# Patient Record
Sex: MALE | Race: WHITE
[De-identification: names, ages, dates, MRNs, and addresses within clinical notes are randomized per-mention and may not be internally consistent; named-entity substitution may affect disease eponyms.]

---

## 2019-09-26 ENCOUNTER — HOSPITAL ENCOUNTER (OUTPATIENT)
Dept: HOSPITAL 75 - LAB | Age: 84
End: 2019-09-26
Attending: UROLOGY
Payer: MEDICARE

## 2019-09-26 DIAGNOSIS — R31.0: Primary | ICD-10-CM

## 2019-09-26 LAB
APTT PPP: YELLOW S
BACTERIA #/AREA URNS HPF: NEGATIVE /HPF
BILIRUB UR QL STRIP: NEGATIVE
FIBRINOGEN PPP-MCNC: CLEAR MG/DL
GLUCOSE UR STRIP-MCNC: NEGATIVE MG/DL
KETONES UR QL STRIP: NEGATIVE
LEUKOCYTE ESTERASE UR QL STRIP: (no result)
NITRITE UR QL STRIP: NEGATIVE
OTHER ELEMENTS URNS MICRO: (no result) /HPF
PH UR STRIP: 6.5 [PH] (ref 5–9)
PROT UR QL STRIP: (no result)
RBC #/AREA URNS HPF: >100 /HPF
SP GR UR STRIP: 1.01 (ref 1.02–1.02)
SQUAMOUS #/AREA URNS HPF: (no result) /HPF
UROBILINOGEN UR-MCNC: NORMAL MG/DL
WBC #/AREA URNS HPF: (no result) /HPF

## 2019-09-26 PROCEDURE — 81000 URINALYSIS NONAUTO W/SCOPE: CPT

## 2019-10-01 ENCOUNTER — HOSPITAL ENCOUNTER (OUTPATIENT)
Dept: HOSPITAL 75 - RAD FS | Age: 84
End: 2019-10-01
Attending: UROLOGY
Payer: MEDICARE

## 2019-10-01 DIAGNOSIS — K57.30: ICD-10-CM

## 2019-10-01 DIAGNOSIS — K76.9: ICD-10-CM

## 2019-10-01 DIAGNOSIS — K80.20: ICD-10-CM

## 2019-10-01 DIAGNOSIS — K40.90: ICD-10-CM

## 2019-10-01 DIAGNOSIS — N28.1: Primary | ICD-10-CM

## 2019-10-01 DIAGNOSIS — N20.0: ICD-10-CM

## 2019-10-01 PROCEDURE — 74176 CT ABD & PELVIS W/O CONTRAST: CPT

## 2019-10-01 NOTE — DIAGNOSTIC IMAGING REPORT
PROCEDURE: CT abdomen and pelvis without contrast.



TECHNIQUE: Multiple contiguous axial images were obtained through

the abdomen and pelvis without the use of intravenous contrast.

Auto Exposure Controls were utilized during the CT exam to meet

ALARA standards for radiation dose reduction. 



INDICATION:  Gross hematuria.  Abnormal lab values.  History of

prostate surgery.



COMPARISON:  None.



FINDINGS:



The lung bases are clear.  The heart is upper normal in size. 

Pacemaker leads are noted.



There is fatty infiltration of the liver.  There is a 1.4 x 1.0

cm hypoattenuating lesion in the left liver, which appears well

circumscribed and likely a cyst.  There is also a circumscribed

hypoattenuating lesion in the right liver measuring 2.5 x 2.5 cm

in size (image 22, series 2), which likely represents a cyst as

well.  There are numerous calcified stones in the gallbladder. 

There is calcification along the lateral dome of the liver

underlying the diaphragm.  The spleen appears normal.  The

pancreas is normal.  The adrenal glands appear normal.



The right kidney demonstrates multiple lesions, some of which are

hypoattenuating and some are isoattenuating.  The largest is 5.4

cm in size and consistent with a simple cyst.  There is an

exophytic 1.7 cm lesion on the right kidney measuring 14

Hounsfield units, most likely a cyst as well.  The left kidney

demonstrates multiple cysts as well, with multiple nonobstructing

calculi, the largest measuring 6 mm in diameter.  There is a

low-density mass-like structure at the inferior pole of the left

kidney, which measures up to 4.7 x 4.1 cm on axial imaging and

has a somewhat lobulated appearance.



The bowel loops are nondistended without obstruction.  The

appendix is normal.  There is moderate stool throughout the

colon.  There is diverticulosis seen throughout the colon without

evidence of diverticulitis.  No free fluid or free air is seen. 

There is a fat-containing right inguinal hernia without bowel

involvement.  There are advanced degenerative changes in the

bilateral hips, with chondrocalcinosis.



IMPRESSION:

1.  There are multiple cysts in the kidneys bilaterally; however,

there is one hypoattenuating mass-like lesion at the inferior

left kidney, which is indeterminate and concerning for neoplasm. 

Recommend CT or MRI using renal mass protocol.

2.  Nonobstructing calculi in the left kidney.  No hydronephrosis

or obstructing calculi are seen.

3.  Hypoattenuating lesions in the liver are thought to represent

cysts.  Recommend attention on follow-up examination.  There is

hepatic steatosis.

4.  Cholelithiasis.

5.  Moderate stool in the colon with colonic diverticulosis.

6.  Fat-containing right inguinal hernia.



Dictated by: 



  Dictated on workstation # [id number] Rarely

## 2019-10-08 ENCOUNTER — HOSPITAL ENCOUNTER (OUTPATIENT)
Dept: HOSPITAL 75 - RAD FS | Age: 84
End: 2019-10-08
Attending: UROLOGY
Payer: MEDICARE

## 2019-10-08 DIAGNOSIS — K76.89: ICD-10-CM

## 2019-10-08 DIAGNOSIS — K80.20: Primary | ICD-10-CM

## 2019-10-08 DIAGNOSIS — K44.9: ICD-10-CM

## 2019-10-08 DIAGNOSIS — K86.9: ICD-10-CM

## 2019-10-08 DIAGNOSIS — N28.89: ICD-10-CM

## 2019-10-08 LAB
BUN/CREAT SERPL: 16
CREAT SERPL-MCNC: 1.28 MG/DL (ref 0.6–1.3)
GFR SERPLBLD BASED ON 1.73 SQ M-ARVRAT: 54 ML/MIN

## 2019-10-08 PROCEDURE — 74170 CT ABD WO CNTRST FLWD CNTRST: CPT

## 2019-10-08 PROCEDURE — 36415 COLL VENOUS BLD VENIPUNCTURE: CPT

## 2019-10-08 PROCEDURE — 84520 ASSAY OF UREA NITROGEN: CPT

## 2019-10-08 PROCEDURE — 82565 ASSAY OF CREATININE: CPT

## 2019-10-08 NOTE — DIAGNOSTIC IMAGING REPORT
PROCEDURE: CT abdomen with and without contrast.



TECHNIQUE: Multiple contiguous axial CT images of the abdomen

were obtained prior to and after intravenous administration of

iodinated contrast. Auto Exposure Controls were utilized during

the CT exam to meet ALARA standards for radiation dose reduction.





INDICATION:  Gross hematuria.



FINDINGS: The recent non-contrast CT abdomen/pelvis exam of

10/01/2019 noted cysts involving both kidneys. However, there did

appear to be a 4.7 x 4.1 cm hypoattenuating lesion associated

with the left kidney which had somewhat of a lobulated

appearance. This finding was felt to be worrisome for malignancy.





On this exam, postcontrast images reveal that there is a

multiseptated cystic mass in this region with a solid component

measuring approximately 1.6 x 2.9 cm. This finding should be

considered neoplastic until proven otherwise. There are

benign-appearing cysts associated with both kidneys as well.



The nonobstructive calculi involving the left kidney noted

previously are again evident. There is no sign of obstruction of

either collecting system.



There also appears to be a complex predominantly cystic 1.1 x 1.2

cm mass in the head of the pancreas. This finding is nonspecific

but worrisome for malignancy. MRI could provide additional

information regarding this finding but it appears that the

patient has a defibrillator device in place and this would

preclude further evaluation by MRI. If additional imaging is

desired, then PET/CT would be recommended.



The prior study also suggested benign appearing cysts within the

liver. Those findings are again evident and do not seem to have

changed significantly. The cysts have a generally benign

appearance. The 0.6 x 2.3 cm peripheral calcification of the

right lobe of liver seen previously is again evident. The liver

is otherwise unremarkable. The spleen, adrenals, aorta and

inferior vena cava show no sign of acute abnormality. As seen on

the prior exam, there are dense calcifications within the

gallbladder. There is no sign of acute cholecystitis.



The stomach is not well-distended and consequently difficult to

assess. There is a 1.8 x 2.5 cm hiatal hernia.



The lung bases are clear.



The bone windows show no evidence for fracture or for destructive

lesion.



IMPRESSION:

1. There is a complex septated 4.7 x 4.1 cm mass involving the

right kidney. This mass should be considered neoplastic until

proven otherwise.

2. There is also a much smaller complex cystic mass in the head

of the pancreas. This finding is nonspecific but worrisome for

malignancy as well. Recommendations as above.

3. There are benign-appearing cysts associated with the liver.

4. There is a small hiatal hernia. Cholelithiasis is also again

seen but there is no evidence for acute cholecystitis.

5. There is no acute abnormality of the abdomen identified.





Dictated by: 



  Dictated on workstation # [id number]

## 2020-06-10 ENCOUNTER — HOSPITAL ENCOUNTER (OUTPATIENT)
Dept: HOSPITAL 75 - LAB FS | Age: 85
End: 2020-06-10
Attending: FAMILY MEDICINE
Payer: MEDICARE

## 2020-06-10 DIAGNOSIS — I10: Primary | ICD-10-CM

## 2020-06-10 LAB
ALBUMIN SERPL-MCNC: 4.4 GM/DL (ref 3.2–4.5)
ALP SERPL-CCNC: 64 U/L (ref 40–136)
ALT SERPL-CCNC: 12 U/L (ref 0–55)
BILIRUB SERPL-MCNC: 0.6 MG/DL (ref 0.1–1)
BUN/CREAT SERPL: 12
CALCIUM SERPL-MCNC: 9.5 MG/DL (ref 8.5–10.1)
CHLORIDE SERPL-SCNC: 105 MMOL/L (ref 98–107)
CHOLEST SERPL-MCNC: 160 MG/DL (ref ?–200)
CO2 SERPL-SCNC: 26 MMOL/L (ref 21–32)
CREAT SERPL-MCNC: 2.09 MG/DL (ref 0.6–1.3)
GFR SERPLBLD BASED ON 1.73 SQ M-ARVRAT: 31 ML/MIN
GLUCOSE SERPL-MCNC: 100 MG/DL (ref 70–105)
HDLC SERPL-MCNC: 30 MG/DL (ref 40–60)
POTASSIUM SERPL-SCNC: 4.7 MMOL/L (ref 3.6–5)
PROT SERPL-MCNC: 7 GM/DL (ref 6.4–8.2)
SODIUM SERPL-SCNC: 141 MMOL/L (ref 135–145)
TRIGL SERPL-MCNC: 155 MG/DL (ref ?–150)
VLDLC SERPL CALC-MCNC: 31 MG/DL (ref 5–40)

## 2020-06-10 PROCEDURE — 80061 LIPID PANEL: CPT

## 2020-06-10 PROCEDURE — 80053 COMPREHEN METABOLIC PANEL: CPT

## 2020-06-10 PROCEDURE — 36415 COLL VENOUS BLD VENIPUNCTURE: CPT

## 2020-08-25 ENCOUNTER — HOSPITAL ENCOUNTER (OUTPATIENT)
Dept: HOSPITAL 75 - LAB FS | Age: 85
End: 2020-08-25
Attending: FAMILY MEDICINE
Payer: MEDICARE

## 2020-08-25 DIAGNOSIS — Z01.89: Primary | ICD-10-CM

## 2020-08-25 LAB
BUN/CREAT SERPL: 14
CALCIUM SERPL-MCNC: 9.3 MG/DL (ref 8.5–10.1)
CHLORIDE SERPL-SCNC: 104 MMOL/L (ref 98–107)
CO2 SERPL-SCNC: 25 MMOL/L (ref 21–32)
CREAT SERPL-MCNC: 1.9 MG/DL (ref 0.6–1.3)
GFR SERPLBLD BASED ON 1.73 SQ M-ARVRAT: 34 ML/MIN
GLUCOSE SERPL-MCNC: 117 MG/DL (ref 70–105)
POTASSIUM SERPL-SCNC: 4.8 MMOL/L (ref 3.6–5)
SODIUM SERPL-SCNC: 138 MMOL/L (ref 135–145)

## 2020-08-25 PROCEDURE — 36415 COLL VENOUS BLD VENIPUNCTURE: CPT

## 2020-08-25 PROCEDURE — 80048 BASIC METABOLIC PNL TOTAL CA: CPT

## 2020-09-10 ENCOUNTER — HOSPITAL ENCOUNTER (OUTPATIENT)
Dept: HOSPITAL 75 - RAD FS | Age: 85
End: 2020-09-10
Attending: UROLOGY
Payer: MEDICARE

## 2020-09-10 DIAGNOSIS — C64.2: Primary | ICD-10-CM

## 2020-09-10 DIAGNOSIS — Z95.0: ICD-10-CM

## 2020-09-10 PROCEDURE — 71046 X-RAY EXAM CHEST 2 VIEWS: CPT

## 2020-09-10 NOTE — DIAGNOSTIC IMAGING REPORT
INDICATION: Malignant cancer of the kidney.



TIME OF EXAM: 12:48 p.m.



COMPARISON: No prior studies are available for comparison.



FINDINGS: Heart size is normal. Lungs are hyperinflated

consistent with COPD. There is a dual lead left side cardiac

pacemaker in place. There is a slightly rounded density

identified in the left suprahilar location which is

indeterminate. This could represent a vessel en face. There is

also a slightly rounded density in the right midlung peripherally

measuring approximate 11 mm in size. Pulmonary nodule cannot be

entirely excluded. There is no effusion. There is no

pneumothorax.



IMPRESSION: Bilateral parenchymal densities. These are

indeterminate. CT chest would be recommended for further

evaluation. 



Dictated by: 



  Dictated on workstation # [id number]

## 2020-09-16 ENCOUNTER — HOSPITAL ENCOUNTER (OUTPATIENT)
Dept: HOSPITAL 75 - RAD FS | Age: 85
End: 2020-09-16
Attending: UROLOGY
Payer: MEDICARE

## 2020-09-16 DIAGNOSIS — Z90.5: ICD-10-CM

## 2020-09-16 DIAGNOSIS — N28.1: Primary | ICD-10-CM

## 2020-09-16 DIAGNOSIS — K76.89: ICD-10-CM

## 2020-09-16 PROCEDURE — 74150 CT ABDOMEN W/O CONTRAST: CPT

## 2020-09-16 NOTE — DIAGNOSTIC IMAGING REPORT
PROCEDURE: 

CT abdomen without contrast.



TECHNIQUE: 

Multiple contiguous axial images were obtained through the

abdomen without the use of intravenous contrast. Auto Exposure

Controls were utilized during the CT exam to meet ALARA standards

for radiation dose reduction. 



INDICATION:  

Renal mass.



COMPARISON:    

10/08/2019.



FINDINGS:

The unenhanced images of the liver and spleen are stable with

multiple hepatic cysts and dystrophic calcification in the

periphery of the right hepatic lobe. There is a fat-containing

structure in the uncinate process which could represent a lipoma.

The pancreatic head lesion seen on the previous contrast enhanced

study is not well seen on the current examination. The pancreas

is otherwise unremarkable. Dominant cysts are again noted in the

right kidney. Evaluation of the kidneys is somewhat limited

without intravenous contrast; however, no new mass or

hydronephrosis is identified. The left kidney is surgically

absent. No mass is seen within the left renal bed. The adrenal

glands are unremarkable. There is no evidence of free fluid or

pathologically enlarged adenopathy.



IMPRESSION: 

Interval left nephrectomy without discrete residual mass or

adenopathy. There are hepatic and right renal cysts present with

focal low density in the uncinate process which could represent a

lipoma. The suspected lesion in the pancreatic head seen on the

previous contrast-enhanced study is less conspicuous on the

current exam.



Note is made of cholelithiasis; however, no new abnormality or

adverse change is seen.



Dictated by: 



  Dictated on workstation # [id number]

## 2020-10-13 ENCOUNTER — HOSPITAL ENCOUNTER (OUTPATIENT)
Dept: HOSPITAL 75 - RAD FS | Age: 85
End: 2020-10-13
Attending: UROLOGY
Payer: MEDICARE

## 2020-10-13 DIAGNOSIS — N28.89: Primary | ICD-10-CM

## 2020-10-13 PROCEDURE — 71250 CT THORAX DX C-: CPT

## 2020-10-13 NOTE — DIAGNOSTIC IMAGING REPORT
INDICATION: Renal mass, metastatic workup.



TECHNIQUE: Multiple contiguous axial images were obtained through

the chest without the use of intravenous contrast. Auto Exposure

Controls were utilized during the CT exam to meet ALARA standards

for radiation dose reduction. 



CT chest obtained without IV contrast. There is no prior chest CT

for comparison.



FINDINGS: There are no enlarged mediastinal or hilar nodes. There

is a dual-lead pacemaker device in place with right atrial and

ventricular leads. There are no enlarged axillary nodes or chest

wall masses. There is no pleural or pericardial fluid.



There are no destructive bony lesions.



Lung parenchymal windows demonstrate a calcified granuloma in the

right apex. There is no consolidation or suspicious pulmonary

parenchymal nodule.



Visualized portions of the upper abdomen demonstrate

benign-appearing cysts in the right kidney and right lobe of the

liver, patient has had left nephrectomy.



IMPRESSION:



CT chest shows no evidence of metastatic disease in the chest.

There is a calcified granuloma in the right apex. There is no

adenopathy or pleural fluid.



Dictated by: 



  Dictated on workstation # [id number]

## 2020-11-09 ENCOUNTER — HOSPITAL ENCOUNTER (OUTPATIENT)
Dept: HOSPITAL 75 - LAB FS | Age: 85
End: 2020-11-09
Attending: NURSE PRACTITIONER
Payer: MEDICARE

## 2020-11-09 DIAGNOSIS — Z01.812: Primary | ICD-10-CM

## 2020-11-09 DIAGNOSIS — Z20.828: ICD-10-CM

## 2020-11-09 PROCEDURE — 87635 SARS-COV-2 COVID-19 AMP PRB: CPT

## 2020-12-15 ENCOUNTER — HOSPITAL ENCOUNTER (OUTPATIENT)
Dept: HOSPITAL 75 - LAB FS | Age: 85
End: 2020-12-15
Attending: FAMILY MEDICINE
Payer: MEDICARE

## 2020-12-15 DIAGNOSIS — I10: Primary | ICD-10-CM

## 2020-12-15 LAB
ALBUMIN SERPL-MCNC: 4.7 GM/DL (ref 3.2–4.5)
ALP SERPL-CCNC: 67 U/L (ref 40–136)
ALT SERPL-CCNC: 13 U/L (ref 0–55)
BILIRUB SERPL-MCNC: 0.5 MG/DL (ref 0.1–1)
BUN/CREAT SERPL: 15
CALCIUM SERPL-MCNC: 9.5 MG/DL (ref 8.5–10.1)
CHLORIDE SERPL-SCNC: 107 MMOL/L (ref 98–107)
CHOLEST SERPL-MCNC: 161 MG/DL (ref ?–200)
CO2 SERPL-SCNC: 26 MMOL/L (ref 21–32)
CREAT SERPL-MCNC: 1.93 MG/DL (ref 0.6–1.3)
GFR SERPLBLD BASED ON 1.73 SQ M-ARVRAT: 33 ML/MIN
GLUCOSE SERPL-MCNC: 102 MG/DL (ref 70–105)
HDLC SERPL-MCNC: 33 MG/DL (ref 40–60)
POTASSIUM SERPL-SCNC: 4.8 MMOL/L (ref 3.6–5)
PROT SERPL-MCNC: 7.1 GM/DL (ref 6.4–8.2)
SODIUM SERPL-SCNC: 142 MMOL/L (ref 135–145)
TRIGL SERPL-MCNC: 167 MG/DL (ref ?–150)
VLDLC SERPL CALC-MCNC: 33 MG/DL (ref 5–40)

## 2020-12-15 PROCEDURE — 80053 COMPREHEN METABOLIC PANEL: CPT

## 2020-12-15 PROCEDURE — 36415 COLL VENOUS BLD VENIPUNCTURE: CPT

## 2020-12-15 PROCEDURE — 80061 LIPID PANEL: CPT

## 2020-12-31 ENCOUNTER — HOSPITAL ENCOUNTER (OUTPATIENT)
Dept: HOSPITAL 75 - LAB FS | Age: 85
End: 2020-12-31
Attending: FAMILY MEDICINE
Payer: MEDICARE

## 2020-12-31 DIAGNOSIS — Z20.828: Primary | ICD-10-CM

## 2020-12-31 PROCEDURE — 87635 SARS-COV-2 COVID-19 AMP PRB: CPT

## 2021-06-24 ENCOUNTER — HOSPITAL ENCOUNTER (OUTPATIENT)
Dept: HOSPITAL 75 - LAB FS | Age: 86
End: 2021-06-24
Attending: FAMILY MEDICINE
Payer: MEDICARE

## 2021-06-24 DIAGNOSIS — I10: Primary | ICD-10-CM

## 2021-06-24 LAB
ALBUMIN SERPL-MCNC: 4.6 GM/DL (ref 3.2–4.5)
ALP SERPL-CCNC: 62 U/L (ref 40–136)
ALT SERPL-CCNC: 17 U/L (ref 0–55)
BILIRUB SERPL-MCNC: 0.6 MG/DL (ref 0.1–1)
BUN/CREAT SERPL: 11
CALCIUM SERPL-MCNC: 9.3 MG/DL (ref 8.5–10.1)
CHLORIDE SERPL-SCNC: 108 MMOL/L (ref 98–107)
CHOLEST SERPL-MCNC: 162 MG/DL (ref ?–200)
CO2 SERPL-SCNC: 23 MMOL/L (ref 21–32)
CREAT SERPL-MCNC: 1.95 MG/DL (ref 0.6–1.3)
GFR SERPLBLD BASED ON 1.73 SQ M-ARVRAT: 33 ML/MIN
GLUCOSE SERPL-MCNC: 101 MG/DL (ref 70–105)
HDLC SERPL-MCNC: 32 MG/DL (ref 40–60)
POTASSIUM SERPL-SCNC: 4.3 MMOL/L (ref 3.6–5)
PROT SERPL-MCNC: 7.1 GM/DL (ref 6.4–8.2)
SODIUM SERPL-SCNC: 142 MMOL/L (ref 135–145)
TRIGL SERPL-MCNC: 159 MG/DL (ref ?–150)
VLDLC SERPL CALC-MCNC: 32 MG/DL (ref 5–40)

## 2021-06-24 PROCEDURE — 36415 COLL VENOUS BLD VENIPUNCTURE: CPT

## 2021-06-24 PROCEDURE — 80053 COMPREHEN METABOLIC PANEL: CPT

## 2021-06-24 PROCEDURE — 80061 LIPID PANEL: CPT

## 2021-09-07 ENCOUNTER — HOSPITAL ENCOUNTER (OUTPATIENT)
Dept: HOSPITAL 75 - LAB FS | Age: 86
End: 2021-09-07
Attending: INTERNAL MEDICINE
Payer: MEDICARE

## 2021-09-07 DIAGNOSIS — N18.32: Primary | ICD-10-CM

## 2021-09-07 LAB
APTT PPP: YELLOW S
BACTERIA #/AREA URNS HPF: NEGATIVE /HPF
BILIRUB UR QL STRIP: NEGATIVE
BUN/CREAT SERPL: 12
CALCIUM SERPL-MCNC: 9.2 MG/DL (ref 8.5–10.1)
CHLORIDE SERPL-SCNC: 107 MMOL/L (ref 98–107)
CO2 SERPL-SCNC: 23 MMOL/L (ref 21–32)
CREAT SERPL-MCNC: 2.15 MG/DL (ref 0.6–1.3)
FIBRINOGEN PPP-MCNC: CLEAR MG/DL
GFR SERPLBLD BASED ON 1.73 SQ M-ARVRAT: 29 ML/MIN
GLUCOSE SERPL-MCNC: 142 MG/DL (ref 70–105)
GLUCOSE UR STRIP-MCNC: NEGATIVE MG/DL
KETONES UR QL STRIP: (no result)
LEUKOCYTE ESTERASE UR QL STRIP: NEGATIVE
NITRITE UR QL STRIP: NEGATIVE
OTHER ELEMENTS URNS MICRO: (no result) /HPF
PH UR STRIP: 5.5 [PH] (ref 5–9)
POTASSIUM SERPL-SCNC: 4.7 MMOL/L (ref 3.6–5)
PROT UR QL STRIP: NEGATIVE
RBC #/AREA URNS HPF: (no result) /HPF
SODIUM SERPL-SCNC: 141 MMOL/L (ref 135–145)
SP GR UR STRIP: 1.02 (ref 1.02–1.02)
SQUAMOUS #/AREA URNS HPF: (no result) /HPF
WBC #/AREA URNS HPF: (no result) /HPF

## 2021-09-07 PROCEDURE — 85018 HEMOGLOBIN: CPT

## 2021-09-07 PROCEDURE — 84100 ASSAY OF PHOSPHORUS: CPT

## 2021-09-07 PROCEDURE — 80048 BASIC METABOLIC PNL TOTAL CA: CPT

## 2021-09-07 PROCEDURE — 81000 URINALYSIS NONAUTO W/SCOPE: CPT

## 2021-09-07 PROCEDURE — 83970 ASSAY OF PARATHORMONE: CPT

## 2021-09-07 PROCEDURE — 36415 COLL VENOUS BLD VENIPUNCTURE: CPT

## 2021-09-07 PROCEDURE — 85014 HEMATOCRIT: CPT

## 2021-09-07 PROCEDURE — 82306 VITAMIN D 25 HYDROXY: CPT

## 2021-09-08 LAB
HCT VFR BLD CALC: 42 % (ref 40–54)
HGB BLD-MCNC: 14.5 G/DL (ref 13.3–17.7)
PHOSPHATE SERPL-MCNC: 3.3 MG/DL (ref 2.3–4.7)

## 2021-10-18 ENCOUNTER — HOSPITAL ENCOUNTER (OUTPATIENT)
Dept: HOSPITAL 75 - RAD FS | Age: 86
End: 2021-10-18
Attending: FAMILY MEDICINE
Payer: MEDICARE

## 2021-10-18 DIAGNOSIS — M16.11: Primary | ICD-10-CM

## 2021-10-18 PROCEDURE — 72170 X-RAY EXAM OF PELVIS: CPT

## 2021-10-18 PROCEDURE — 73502 X-RAY EXAM HIP UNI 2-3 VIEWS: CPT

## 2021-10-18 NOTE — DIAGNOSTIC IMAGING REPORT
INDICATION: 

Overuse injury. Right hip pain.



FINDINGS:

Two views of the right hip show no fracture, dislocation, or

other acute abnormality. There is diffuse narrowing of the hip

joint. There is considerable spurring. There is slight flattening

of the superior femoral head. No avascular necrosis is evident.



IMPRESSION: 

There are moderately advanced degenerative changes present with

no acute abnormality seen.



Dictated by: 



  Dictated on workstation # [id number]

## 2021-10-18 NOTE — DIAGNOSTIC IMAGING REPORT
INDICATION: 

Right hip pain.



FINDINGS:

A single view of the pelvis shows no fracture, dislocation, or

other acute bony abnormality. There is moderate degenerative

change of the left hip with narrowing and spurring. There is more

advanced degenerative change of the right hip with diffuse

narrowing and large spurs.



IMPRESSION: 

There are degenerative changes present with no acute abnormality

seen.



Dictated by: 



  Dictated on workstation # [id number]

## 2021-12-21 ENCOUNTER — HOSPITAL ENCOUNTER (OUTPATIENT)
Dept: HOSPITAL 75 - LAB FS | Age: 86
End: 2021-12-21
Attending: FAMILY MEDICINE
Payer: MEDICARE

## 2021-12-21 DIAGNOSIS — I10: Primary | ICD-10-CM

## 2021-12-21 LAB
ALBUMIN SERPL-MCNC: 4.7 GM/DL (ref 3.2–4.5)
ALP SERPL-CCNC: 61 U/L (ref 40–136)
ALT SERPL-CCNC: 18 U/L (ref 0–55)
BILIRUB SERPL-MCNC: 0.4 MG/DL (ref 0.1–1)
BUN/CREAT SERPL: 13
CALCIUM SERPL-MCNC: 9.5 MG/DL (ref 8.5–10.1)
CHLORIDE SERPL-SCNC: 104 MMOL/L (ref 98–107)
CHOLEST SERPL-MCNC: 167 MG/DL (ref ?–200)
CO2 SERPL-SCNC: 26 MMOL/L (ref 21–32)
CREAT SERPL-MCNC: 1.89 MG/DL (ref 0.6–1.3)
GFR SERPLBLD BASED ON 1.73 SQ M-ARVRAT: 34 ML/MIN
GLUCOSE SERPL-MCNC: 100 MG/DL (ref 70–105)
HDLC SERPL-MCNC: 32 MG/DL (ref 40–60)
POTASSIUM SERPL-SCNC: 4.5 MMOL/L (ref 3.6–5)
PROT SERPL-MCNC: 7.4 GM/DL (ref 6.4–8.2)
SODIUM SERPL-SCNC: 141 MMOL/L (ref 135–145)
TRIGL SERPL-MCNC: 172 MG/DL (ref ?–150)
VLDLC SERPL CALC-MCNC: 34 MG/DL (ref 5–40)

## 2021-12-21 PROCEDURE — 80053 COMPREHEN METABOLIC PANEL: CPT

## 2021-12-21 PROCEDURE — 36415 COLL VENOUS BLD VENIPUNCTURE: CPT

## 2021-12-21 PROCEDURE — 80061 LIPID PANEL: CPT

## 2022-03-01 ENCOUNTER — HOSPITAL ENCOUNTER (OUTPATIENT)
Dept: HOSPITAL 75 - LAB FS | Age: 87
End: 2022-03-01
Attending: REGISTERED NURSE
Payer: MEDICARE

## 2022-03-01 DIAGNOSIS — N18.9: Primary | ICD-10-CM

## 2022-03-01 LAB
ALBUMIN SERPL-MCNC: 4.7 GM/DL (ref 3.2–4.5)
ALP SERPL-CCNC: 65 U/L (ref 40–136)
ALT SERPL-CCNC: 19 U/L (ref 0–55)
BILIRUB SERPL-MCNC: 0.4 MG/DL (ref 0.1–1)
BUN/CREAT SERPL: 12
CALCIUM SERPL-MCNC: 9.5 MG/DL (ref 8.5–10.1)
CHLORIDE SERPL-SCNC: 104 MMOL/L (ref 98–107)
CO2 SERPL-SCNC: 23 MMOL/L (ref 21–32)
CREAT SERPL-MCNC: 1.77 MG/DL (ref 0.6–1.3)
GFR SERPLBLD BASED ON 1.73 SQ M-ARVRAT: 37 ML/MIN
GLUCOSE SERPL-MCNC: 154 MG/DL (ref 70–105)
POTASSIUM SERPL-SCNC: 4.2 MMOL/L (ref 3.6–5)
PROT SERPL-MCNC: 7 GM/DL (ref 6.4–8.2)
SODIUM SERPL-SCNC: 138 MMOL/L (ref 135–145)

## 2022-03-01 PROCEDURE — 36415 COLL VENOUS BLD VENIPUNCTURE: CPT

## 2022-03-01 PROCEDURE — 80053 COMPREHEN METABOLIC PANEL: CPT

## 2022-09-23 ENCOUNTER — HOSPITAL ENCOUNTER (OUTPATIENT)
Dept: HOSPITAL 75 - RAD FS | Age: 87
End: 2022-09-23
Attending: UROLOGY
Payer: MEDICARE

## 2022-09-23 DIAGNOSIS — Z90.5: ICD-10-CM

## 2022-09-23 DIAGNOSIS — K76.89: ICD-10-CM

## 2022-09-23 DIAGNOSIS — C64.2: Primary | ICD-10-CM

## 2022-09-23 DIAGNOSIS — N28.1: ICD-10-CM

## 2022-09-23 DIAGNOSIS — K80.20: ICD-10-CM

## 2022-09-23 PROCEDURE — 74150 CT ABDOMEN W/O CONTRAST: CPT

## 2022-09-23 PROCEDURE — 71250 CT THORAX DX C-: CPT

## 2022-09-23 NOTE — DIAGNOSTIC IMAGING REPORT
PROCEDURE: CT chest and abdomen without contrast.



TECHNIQUE: Axial images were obtained from the thoracic inlet

through the iliac crest without the administration of intravenous

contrast. Auto Exposure Controls were utilized during the CT exam

to meet ALARA standards for radiation dose reduction. 



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

left nephrectomy.



CORRELATION is made with prior CT chest from 10/13/2020 and CT

abdomen from 09/16/2020.



CT CHEST:



Left chest wall cardiac pacemaker is noted. No axillary

lymphadenopathy is identified. No definite mediastinal or hilar

lymphadenopathy is seen. There is no pericardial or pleural fluid

identified. No pulmonary infiltrates, nodules or masses are

detected. Bony structures are nonacute.



CT ABDOMEN:



Hepatic low-attenuation lesions are again noted and appear stable

and most consistent with cysts. There are multiple stones in the

gallbladder. There is no biliary ductal dilatation. The pancreas

and spleen are unremarkable. No adrenal mass is seen. Left kidney

is surgically absent. Right kidney contains cortical cysts. There

is an exophytic lesion arising from the lateral aspect of the

right kidney which is slightly more dense than simple fluid. The

may represent a hemorrhagic cyst. This is stable at 16 mm. No

calculi are seen. There is no hydronephrosis. Aorta is

nonaneurysmal. Bowel loops are normal caliber. There is no

ascites.



IMPRESSION: 



Stable CT of the abdomen since prior study from 09/16/2020. There

are postop changes from left nephrectomy. There is hepatic and

right renal cysts as well as cholelithiasis.



Dictated by: 



  Dictated on workstation # [id number]

## 2023-04-13 ENCOUNTER — HOSPITAL ENCOUNTER (OUTPATIENT)
Dept: HOSPITAL 75 - LAB FS | Age: 88
End: 2023-04-13
Attending: INTERNAL MEDICINE
Payer: MEDICARE

## 2023-04-13 DIAGNOSIS — Z79.899: ICD-10-CM

## 2023-04-13 DIAGNOSIS — I12.9: Primary | ICD-10-CM

## 2023-04-13 DIAGNOSIS — N18.32: ICD-10-CM

## 2023-04-13 DIAGNOSIS — Z85.528: ICD-10-CM

## 2023-04-13 LAB
ALBUMIN SERPL-MCNC: 4.6 GM/DL (ref 3.2–4.5)
BASOPHILS # BLD AUTO: 0 10^3/UL (ref 0–0.1)
BASOPHILS NFR BLD AUTO: 0 % (ref 0–10)
BUN/CREAT SERPL: 12
CALCIUM SERPL-MCNC: 9.7 MG/DL (ref 8.5–10.1)
CHLORIDE SERPL-SCNC: 104 MMOL/L (ref 98–107)
CO2 SERPL-SCNC: 24 MMOL/L (ref 21–32)
CREAT SERPL-MCNC: 1.56 MG/DL (ref 0.6–1.3)
EOSINOPHIL # BLD AUTO: 0.1 10^3/UL (ref 0–0.3)
EOSINOPHIL NFR BLD AUTO: 3 % (ref 0–10)
GFR SERPLBLD BASED ON 1.73 SQ M-ARVRAT: 43 ML/MIN
GLUCOSE SERPL-MCNC: 102 MG/DL (ref 70–105)
HCT VFR BLD CALC: 41 % (ref 40–54)
HGB BLD-MCNC: 13.9 G/DL (ref 13.3–17.7)
LYMPHOCYTES # BLD AUTO: 0.7 10^3/UL (ref 1–4)
LYMPHOCYTES NFR BLD AUTO: 14 % (ref 12–44)
MANUAL DIFFERENTIAL PERFORMED BLD QL: NO
MCH RBC QN AUTO: 30 PG (ref 25–34)
MCHC RBC AUTO-ENTMCNC: 34 G/DL (ref 32–36)
MCV RBC AUTO: 87 FL (ref 80–99)
MONOCYTES # BLD AUTO: 0.3 10^3/UL (ref 0–1)
MONOCYTES NFR BLD AUTO: 7 % (ref 0–12)
NEUTROPHILS # BLD AUTO: 3.6 10^3/UL (ref 1.8–7.8)
NEUTROPHILS NFR BLD AUTO: 76 % (ref 42–75)
PLATELET # BLD: 175 10^3/UL (ref 130–400)
PMV BLD AUTO: 9.1 FL (ref 9–12.2)
POTASSIUM SERPL-SCNC: 4.4 MMOL/L (ref 3.6–5)
SODIUM SERPL-SCNC: 140 MMOL/L (ref 135–145)
WBC # BLD AUTO: 4.7 10^3/UL (ref 4.3–11)

## 2023-04-13 PROCEDURE — 83970 ASSAY OF PARATHORMONE: CPT

## 2023-04-13 PROCEDURE — 82306 VITAMIN D 25 HYDROXY: CPT

## 2023-04-13 PROCEDURE — 84156 ASSAY OF PROTEIN URINE: CPT

## 2023-04-13 PROCEDURE — 85025 COMPLETE CBC W/AUTO DIFF WBC: CPT

## 2023-04-13 PROCEDURE — 80069 RENAL FUNCTION PANEL: CPT

## 2023-04-13 PROCEDURE — 84550 ASSAY OF BLOOD/URIC ACID: CPT

## 2023-04-13 PROCEDURE — 82570 ASSAY OF URINE CREATININE: CPT

## 2023-04-13 PROCEDURE — 36415 COLL VENOUS BLD VENIPUNCTURE: CPT

## 2023-04-14 LAB
CREAT UR-MCNC: 44 MG/DL (ref 30–125)
PHOSPHATE SERPL-MCNC: 3.2 MG/DL (ref 2.3–4.7)
PROT UR-MCNC: < 6 MG/DL (ref 6–12)
URATE SERPL-MCNC: 5.5 MG/DL (ref 2.6–7.2)